# Patient Record
Sex: MALE | Race: BLACK OR AFRICAN AMERICAN | Employment: FULL TIME | URBAN - METROPOLITAN AREA
[De-identification: names, ages, dates, MRNs, and addresses within clinical notes are randomized per-mention and may not be internally consistent; named-entity substitution may affect disease eponyms.]

---

## 2024-10-10 ENCOUNTER — OFFICE VISIT (OUTPATIENT)
Age: 54
End: 2024-10-10

## 2024-10-10 VITALS
SYSTOLIC BLOOD PRESSURE: 144 MMHG | WEIGHT: 181 LBS | DIASTOLIC BLOOD PRESSURE: 81 MMHG | HEART RATE: 120 BPM | OXYGEN SATURATION: 99 % | TEMPERATURE: 99.6 F

## 2024-10-10 DIAGNOSIS — R05.1 ACUTE COUGH: ICD-10-CM

## 2024-10-10 DIAGNOSIS — R00.0 TACHYCARDIA: Primary | ICD-10-CM

## 2024-10-10 RX ORDER — ASCORBIC ACID 500 MG
TABLET ORAL
COMMUNITY

## 2024-10-10 RX ORDER — DIPHENHYDRAMINE HCL 50 MG
50 CAPSULE ORAL EVERY 6 HOURS PRN
COMMUNITY

## 2024-10-10 RX ORDER — OLMESARTAN MEDOXOMIL 40 MG/1
40 TABLET ORAL NIGHTLY
COMMUNITY

## 2024-10-11 NOTE — PATIENT INSTRUCTIONS
Patient is seen today with rapid heart rate after returning home from a trip overseas  Additionally he does report a low-grade temperature, nasal congestion, bilateral eye redness  His EKG in clinic today shows sinus tachycardia with possible left atrial enlargement and left ventricular hypertrophy  Chest x-ray shows no acute abnormality  He is not having any swelling or pain in his calves or lower legs, no history of DVT or PE, non-smoker, no recent surgeries  I think that it is unlikely that he has a blood clot, but given his tachycardia, recent long travel, and general ill feeling, I do have some concern  I do recommend that he seek further evaluation in the emergency department to rule out PE as a cause of his symptoms

## 2024-10-11 NOTE — PROGRESS NOTES
Alyssa Kelly (:  1970) is a 54 y.o. male,New patient, here for evaluation of the following chief complaint(s):  Congestion (Sinus congestion and red eyes x3 days, tachycardia since this morning. Returned from [place] 5 days ago. Negative at home covid test today.) and Care Coordination (Referred Pt to ER for further evaluation.)      Assessment & Plan :  Visit Diagnoses and Associated Orders       Tachycardia    -  Primary         Acute cough        XR CHEST PA/LAT [01584 CPT(R)]   - Future Order         ORDERS WITHOUT AN ASSOCIATED DIAGNOSIS    VITAMIN D PO [19075]      vitamin C (ASCORBIC ACID) 500 MG tablet [8680]      olmesartan (BENICAR) 40 MG tablet [57152]      Multiple Vitamin (MULTIVITAMIN ADULT PO) [137740]      Omega-3 Fatty Acids (FISH OIL) 300 MG CAPS [96165]      diphenhydrAMINE (BENADRYL) 50 MG capsule [0380]            Patient is seen today with rapid heart rate after returning home from a trip overseas  Additionally he does report a low-grade temperature, nasal congestion, bilateral eye redness  His EKG in clinic today shows sinus tachycardia with possible left atrial enlargement and left ventricular hypertrophy  Chest x-ray shows no acute abnormality  He is not having any swelling or pain in his calves or lower legs, no history of DVT or PE, non-smoker, no recent surgeries  I think that it is unlikely that he has a blood clot, but given his tachycardia, recent long travel, and general ill feeling, I do have some concern  Other differentials include acute viral illness, viral sinusitis.  I do recommend that he seek further evaluation in the emergency department to rule out PE as a cause of his symptoms           Subjective :  HPI     54 y.o. male presents with symptoms of 3 to 4 days of nasal congestion, postnasal drip, scratchy throat.  He states that he recently returned from overseas on a trip to [place].  He states that his symptoms began shortly upon returning.  Upon waking this